# Patient Record
Sex: FEMALE | Race: WHITE
[De-identification: names, ages, dates, MRNs, and addresses within clinical notes are randomized per-mention and may not be internally consistent; named-entity substitution may affect disease eponyms.]

---

## 2021-06-19 ENCOUNTER — APPOINTMENT (OUTPATIENT)
Dept: DISASTER EMERGENCY | Facility: CLINIC | Age: 36
End: 2021-06-19

## 2021-06-19 DIAGNOSIS — Z01.818 ENCOUNTER FOR OTHER PREPROCEDURAL EXAMINATION: ICD-10-CM

## 2021-06-20 LAB — SARS-COV-2 N GENE NPH QL NAA+PROBE: NOT DETECTED

## 2021-06-22 ENCOUNTER — OUTPATIENT (OUTPATIENT)
Dept: OUTPATIENT SERVICES | Facility: HOSPITAL | Age: 36
LOS: 1 days | End: 2021-06-22

## 2021-10-12 ENCOUNTER — APPOINTMENT (OUTPATIENT)
Dept: RHEUMATOLOGY | Facility: CLINIC | Age: 36
End: 2021-10-12
Payer: MEDICAID

## 2021-10-12 VITALS
SYSTOLIC BLOOD PRESSURE: 116 MMHG | RESPIRATION RATE: 18 BRPM | OXYGEN SATURATION: 98 % | DIASTOLIC BLOOD PRESSURE: 72 MMHG | WEIGHT: 116 LBS | HEART RATE: 84 BPM | HEIGHT: 63 IN | BODY MASS INDEX: 20.55 KG/M2 | TEMPERATURE: 98.1 F

## 2021-10-12 DIAGNOSIS — M51.26 OTHER INTERVERTEBRAL DISC DISPLACEMENT, LUMBAR REGION: ICD-10-CM

## 2021-10-12 DIAGNOSIS — B97.7 PAPILLOMAVIRUS AS THE CAUSE OF DISEASES CLASSIFIED ELSEWHERE: ICD-10-CM

## 2021-10-12 DIAGNOSIS — Z87.19 PERSONAL HISTORY OF OTHER DISEASES OF THE DIGESTIVE SYSTEM: ICD-10-CM

## 2021-10-12 DIAGNOSIS — Z86.39 PERSONAL HISTORY OF OTHER ENDOCRINE, NUTRITIONAL AND METABOLIC DISEASE: ICD-10-CM

## 2021-10-12 DIAGNOSIS — Z86.69 PERSONAL HISTORY OF OTHER DISEASES OF THE NERVOUS SYSTEM AND SENSE ORGANS: ICD-10-CM

## 2021-10-12 PROCEDURE — 99204 OFFICE O/P NEW MOD 45 MIN: CPT | Mod: 25

## 2021-10-12 NOTE — ASSESSMENT
[FreeTextEntry1] : 37 y/o female referred to rheumatology for multiple MSK pains, DJD, disc herniations x 10 years, recurrent baker's cyst, spontaneous rib fracture which is not expected for patient at such young age. Pt's workup by PCP has been negative for RF, CCP, ANCAs, Lyme. Mild elevation of Ca and low Alk Phos is noted.\par \par Patient's multiple MSK issues at such young age raises concern for a metabolic or genetic disease, but not classic for underlying inflammatory rheumatic disease. Hypophosphatasia (low alk phos) should be evaluated in setting of persistently low alk phos with various MSK symptoms.\par \par - Obtain bloodwork to evaluate for underlying CTD, including SLE and sarcoidosis (high Ca)\par - Obtain bloodwork for hypophosphatasia.\par - Pt has had numerous imaging so far and currently does not need another imaging at this time.\par - Based on above workup, consider B6 level (elevated in hypophosphatasia), Mg, urine phosphoethanolamine (PEA) in urine, pyridoxal 5-phosphate (PLP) in plasma, ALPL genetic testing\par - RTC in 1 month to discuss above workup

## 2021-10-12 NOTE — HISTORY OF PRESENT ILLNESS
[FreeTextEntry1] : 35 y/o female referred to rheumatology for joint pains. \par Pt reports back, neck, b/l hips, elbows, shoulders, shins pains x 10 years. Pt had broken rib without trauma. Pt has had multiple MRI showing DJD, disc herniations, tears, which is not expected for patient at such young age. Pt had recurrent Baker's cyst with rupture few months ago. Pt was referred to rheumatology for evaluation for underlying inflammatory disease.\par Hx of tooth chipped.\par Patient denies other systemic symptoms other than joint issues including rash, GI/pulm symptoms, Raynaud's.\par ROS negative unless otherwise noted above.\par \par Labs (8/2021) \par Remarkable for: Ca 10.1, Alk Phos 30\par Normal/neg: CBC, CMP (except Ca and Alk Phos), iron panel, SPEP, LENORA, TSH\par Negative ANCAs, RF, CCP, Lyme, CRP, ESR 20\par \par LLE US (6/2021) Residual ruptured popliteal cyst\par MR Pelvis (1/2019) Mild L greater trochanteric bursitis. Findings suggesting bilateral acetabular labral tears\par MR L-Spine (4/2019) with disc bulge, nerve root impingements.\par MR C-Spine (1/2020) straightening of cervical spine, Multilevel DJD without canal stenosis or neural foraminal narrowing\par MR L knee (7/2021) with chondral fissure of medial femoral condyle, subchondral bone marrow edema, chondromalacia patella, medial collateral ligament sprain, patellar maltracking, popliteal cyst, trace joint effusion\par

## 2021-10-13 LAB
24R-OH-CALCIDIOL SERPL-MCNC: 42.7 PG/ML
ALP BLD-CCNC: 27 U/L
APPEARANCE: CLEAR
BILIRUBIN URINE: NEGATIVE
BLOOD URINE: NEGATIVE
C3 SERPL-MCNC: 120 MG/DL
C4 SERPL-MCNC: 22 MG/DL
CA-I SERPL-SCNC: 1.22 MMOL/L
CALCIUM SERPL-MCNC: 9.8 MG/DL
COLOR: NORMAL
CREAT SPEC-SCNC: 51 MG/DL
CREAT/PROT UR: 0.1 RATIO
CRP SERPL-MCNC: <3 MG/L
ENA RNP AB SER IA-ACNC: 0.3 AL
ENA SM AB SER IA-ACNC: <0.2 AL
ENA SS-A AB SER IA-ACNC: <0.2 AL
ENA SS-B AB SER IA-ACNC: <0.2 AL
ERYTHROCYTE [SEDIMENTATION RATE] IN BLOOD BY WESTERGREN METHOD: 11 MM/HR
GLUCOSE QUALITATIVE U: NEGATIVE
KETONES URINE: NEGATIVE
LEUKOCYTE ESTERASE URINE: NEGATIVE
NITRITE URINE: NEGATIVE
PARATHYROID HORMONE INTACT: 20 PG/ML
PH URINE: 6.5
PHOSPHATE SERPL-MCNC: 3.4 MG/DL
PROT UR-MCNC: 6 MG/DL
PROTEIN URINE: NEGATIVE
SPECIFIC GRAVITY URINE: 1.01
THYROGLOB AB SERPL-ACNC: 151 IU/ML
THYROPEROXIDASE AB SERPL IA-ACNC: 538 IU/ML
UROBILINOGEN URINE: NORMAL

## 2021-10-14 LAB
ACE BLD-CCNC: 29 U/L
ANA SER IF-ACNC: NEGATIVE
DSDNA AB SER-ACNC: <12 IU/ML

## 2021-10-15 LAB
CREAT 24H UR-MCNC: 1 G/24 H
CREAT ?TM UR-MCNC: 60 MG/DL
PHOSPHATE/CREAT 24H UR-SRTO: 0.7 G/24 H
PROT ?TM UR-MCNC: 24 HR
SPECIMEN VOL 24H UR: 1750 ML
U PHOS: 40.7 MG/DL

## 2021-10-16 LAB
ALP BLD-CCNC: 27 IU/L
ALP BONE CFR SERPL: 27 %
ALP INTEST CFR SERPL: 7 %
ALP LIVER CFR SERPL: 66 %

## 2021-10-18 ENCOUNTER — NON-APPOINTMENT (OUTPATIENT)
Age: 36
End: 2021-10-18

## 2021-10-18 DIAGNOSIS — Z00.00 ENCOUNTER FOR GENERAL ADULT MEDICAL EXAMINATION W/OUT ABNORMAL FINDINGS: ICD-10-CM

## 2021-10-18 DIAGNOSIS — M25.50 PAIN IN UNSPECIFIED JOINT: ICD-10-CM

## 2021-10-18 DIAGNOSIS — R74.8 ABNORMAL LEVELS OF OTHER SERUM ENZYMES: ICD-10-CM

## 2021-11-05 ENCOUNTER — NON-APPOINTMENT (OUTPATIENT)
Age: 36
End: 2021-11-05

## 2021-11-16 ENCOUNTER — APPOINTMENT (OUTPATIENT)
Dept: RHEUMATOLOGY | Facility: CLINIC | Age: 36
End: 2021-11-16

## 2022-06-08 ENCOUNTER — NON-APPOINTMENT (OUTPATIENT)
Age: 37
End: 2022-06-08